# Patient Record
Sex: MALE | Race: WHITE | NOT HISPANIC OR LATINO | URBAN - METROPOLITAN AREA
[De-identification: names, ages, dates, MRNs, and addresses within clinical notes are randomized per-mention and may not be internally consistent; named-entity substitution may affect disease eponyms.]

---

## 2017-01-09 ENCOUNTER — ALLSCRIPTS OFFICE VISIT (OUTPATIENT)
Dept: OTHER | Facility: OTHER | Age: 47
End: 2017-01-09

## 2017-07-13 ENCOUNTER — ALLSCRIPTS OFFICE VISIT (OUTPATIENT)
Dept: OTHER | Facility: OTHER | Age: 47
End: 2017-07-13

## 2017-10-13 ENCOUNTER — ALLSCRIPTS OFFICE VISIT (OUTPATIENT)
Dept: OTHER | Facility: OTHER | Age: 47
End: 2017-10-13

## 2018-03-07 NOTE — PROGRESS NOTES
"  Discussion/Summary  Normal device function     Multiple PAT  patient discontinued beta blocker due to cost - carvedilol    have patient on metoprolol  Results/Data  Cardiac Device In Clinic [id number] 01:01PM Arty Cutter     Test Name Result Flag Reference   MISCELLANEOUS COMMENT (Report)     DEVICE INTERROGATED IN THE [facility] OFFICE  BATTERY VOLTAGE ADEQUATE (2 7-4 1 YRS)  AP<1%, BVP>99%  25 VHR EPISODES, ALL PAT (1:1), 10/15/15 EPISODE IN [place] MONITOR ZONE @ 206 BPM LASTING 11 SEC  EF-30% (ECHO 4/9/15)  62 AMS EPISODES LONGEST 8 SEC OF PAT  PT D/C'D CARVEDILOL ON HIS OWN DUE TO COST  ONLY TAKING ASA 81MG OCC  TASKED DR Earlene PIERRE IMPEDANCE MONITORING WITHIN NORMAL LIMITS  ALL LEAD PARAMETERS WITHIN NORMAL LIMITS  ALL OTHER TESTING WITHIN NORMAL LIMITS  NORMAL DEVICE FUNCTION  GV   Cardiac Electrophysiology Report      [id number]  pdf   DEVICE TYPE ICD       Cardiac Electrophysiology Report [id number] 01:01PM Arty Cutter     Test Name Result Flag Reference   Cardiac Electrophysiology Report      [id number]  pdf     Signatures   Electronically signed by : Irene Gutierrez RN; Jul 11 2016  9:37AM EST                       (Author)    Electronically signed by : GISEL Ramos ; Aug  8 2016  1:15AM EST                       (Author)    "

## 2018-03-07 NOTE — PROGRESS NOTES
"  Discussion/Summary  Normal device function     Adequate BiV pacing  Results/Data  Cardiac Device In [facility] 12:47PM Sandy Parra     Test Name Result Flag Reference   MISCELLANEOUS COMMENT (Report)     DEVICE INTERROGATED IN THE [facility] OFFICE  BATTERY VOLTAGE ADEQUATE (2 5-3 2 YRS)  AP-0%, BVP>99%  ALL LEAD PARAMETERS WITHIN NORMAL LIMITS  ALL OTHER TESTING WITHIN NORMAL LIMITS  NO SIGNIFICANT HIGH RATE EPISODES  CORVUE IMPEDANCE MONITORING WITHIN NORMAL LIMITS  NORMAL DEVICE FUNCTION  GV   Cardiac Electrophysiology Report      [id number]  pdf   DEVICE TYPE ICD       Cardiac Electrophysiology Report [id number] 12:47PM Sandy Parra     Test Name Result Flag Reference   Cardiac Electrophysiology Report      [id number]  pdf     Signatures   Electronically signed by : Hector Flores RN; Jul 13 2017  8:58AM EST                       (Author)    Electronically signed by : GISEL Garcia ; Jul 24 2017  9:57PM EST                       (Author)    "

## 2018-03-07 NOTE — PROGRESS NOTES
"  Discussion/Summary  Normal device function      Results/Data  Cardiac Device Remote [id number] 05:50AM Kavon Aranas     Test Name Result Flag Reference   MISCELLANEOUS COMMENT (Report)     [employer] TRANSMISSION: BATTERY VOLTAGE ADEQUATE(1 7 YR)  AP < 1% BP > 99%  ALL AVAILABLE LEAD PARAMETERS WITHIN NORMAL LIMITS  4 NSVT EPISODES WITH 3 AVAILABLE EGMS SHOWING PAT VS NSVT (15 @ 179 BPM, 10 @ 202 BPM, 15 @ 180 BPM)  10 AMS FOR PAT, ALL < 4 S  PT TAKES ASA 81MG AND METOPROLOL  EF 30% (ECHO 3/2015)  CORVUE IMPEDANCE MONITORING WITHIN NORMAL LIMITS  NORMAL DEVICE FUNCTION  RG   Cardiac Electrophysiology Report      [id number]  pdf   DEVICE TYPE ICD       Cardiac Electrophysiology Report [id number] 05:50AM Kavon Cornejo     Test Name Result Flag Reference   Cardiac Electrophysiology Report      [id number]  pdf     Signatures   Electronically signed by : Breonna Ashton, ; Oct 13 2017  9:05AM EST                       (Author)    Electronically signed by : GISEL Crowder ; Oct 13 2017 10:37AM EST                       (Author)    "

## 2018-03-07 NOTE — PROGRESS NOTES
"  Discussion/Summary  Normal device function      Results/Data  Cardiac Device In Clinic [id number] 02:16PM Zrocky Shalom     Test Name Result Flag Reference   MISCELLANEOUS COMMENT (Report)     DEVICE INTERROGATED IN THE [facility] OFFICE  BATTERY VOLTAGE ADEQUATE  (3 5 YRS)  PTS DEVICE IS ON ALERT FROM [facility] FOR PREMATURE BATTERY DEPLETION  VIBRATORY NOTIFIER DEMONSTRATED AND PT TOLD TO GO TO THE ER IF NOTIFIER ACTIVATES  IMPORTANCE OF [employer] TRANSMITTER REVIEWED WITH PT  AP 1% BVP 99%  ALL LEAD PARAMETERS WITHIN NORMAL LIMITS  3 NSVT EPISODES DETECTED  14 BEATS @ 293ms  NO THERAPIES GIVEN  CORVUE IMPEDANCE MONITORING WITHIN NORMAL LIMITS  NORMAL DEVICE FUNCTION --[facility]   Cardiac Electrophysiology Report      [id number]  pdf   DEVICE TYPE ICD       Cardiac Electrophysiology Report [id number] 02:16PM Zrocky Shaolm     Test Name Result Flag Reference   Cardiac Electrophysiology Report      [id number]  pdf     Signatures   Electronically signed by : Jackson Carrasquillo, ; Jan 9 2017  9:22AM EST                       (Author)    Electronically signed by : Oh Nielson DO; Felipe 15 2017  9:33AM EST                       (Author)    "